# Patient Record
Sex: MALE | Race: WHITE
[De-identification: names, ages, dates, MRNs, and addresses within clinical notes are randomized per-mention and may not be internally consistent; named-entity substitution may affect disease eponyms.]

---

## 2019-07-16 ENCOUNTER — HOSPITAL ENCOUNTER (INPATIENT)
Dept: HOSPITAL 35 - TBACV | Age: 79
LOS: 2 days | Discharge: HOME | DRG: 246 | End: 2019-07-18
Attending: INTERNAL MEDICINE | Admitting: INTERNAL MEDICINE
Payer: COMMERCIAL

## 2019-07-16 VITALS — DIASTOLIC BLOOD PRESSURE: 96 MMHG | SYSTOLIC BLOOD PRESSURE: 146 MMHG

## 2019-07-16 VITALS — DIASTOLIC BLOOD PRESSURE: 53 MMHG | SYSTOLIC BLOOD PRESSURE: 118 MMHG

## 2019-07-16 VITALS — SYSTOLIC BLOOD PRESSURE: 132 MMHG | DIASTOLIC BLOOD PRESSURE: 50 MMHG

## 2019-07-16 VITALS — DIASTOLIC BLOOD PRESSURE: 66 MMHG | SYSTOLIC BLOOD PRESSURE: 117 MMHG

## 2019-07-16 VITALS — DIASTOLIC BLOOD PRESSURE: 53 MMHG | SYSTOLIC BLOOD PRESSURE: 113 MMHG

## 2019-07-16 VITALS — SYSTOLIC BLOOD PRESSURE: 116 MMHG | DIASTOLIC BLOOD PRESSURE: 59 MMHG

## 2019-07-16 VITALS — SYSTOLIC BLOOD PRESSURE: 129 MMHG | DIASTOLIC BLOOD PRESSURE: 54 MMHG

## 2019-07-16 VITALS — DIASTOLIC BLOOD PRESSURE: 53 MMHG | SYSTOLIC BLOOD PRESSURE: 122 MMHG

## 2019-07-16 VITALS — SYSTOLIC BLOOD PRESSURE: 114 MMHG | DIASTOLIC BLOOD PRESSURE: 93 MMHG

## 2019-07-16 VITALS — DIASTOLIC BLOOD PRESSURE: 67 MMHG | SYSTOLIC BLOOD PRESSURE: 151 MMHG

## 2019-07-16 VITALS — DIASTOLIC BLOOD PRESSURE: 63 MMHG | SYSTOLIC BLOOD PRESSURE: 126 MMHG

## 2019-07-16 VITALS — SYSTOLIC BLOOD PRESSURE: 127 MMHG | DIASTOLIC BLOOD PRESSURE: 67 MMHG

## 2019-07-16 VITALS — DIASTOLIC BLOOD PRESSURE: 58 MMHG | SYSTOLIC BLOOD PRESSURE: 142 MMHG

## 2019-07-16 VITALS — DIASTOLIC BLOOD PRESSURE: 64 MMHG | SYSTOLIC BLOOD PRESSURE: 115 MMHG

## 2019-07-16 VITALS — DIASTOLIC BLOOD PRESSURE: 51 MMHG | SYSTOLIC BLOOD PRESSURE: 108 MMHG

## 2019-07-16 VITALS — SYSTOLIC BLOOD PRESSURE: 133 MMHG | DIASTOLIC BLOOD PRESSURE: 66 MMHG

## 2019-07-16 VITALS — SYSTOLIC BLOOD PRESSURE: 108 MMHG | DIASTOLIC BLOOD PRESSURE: 55 MMHG

## 2019-07-16 VITALS — DIASTOLIC BLOOD PRESSURE: 59 MMHG | SYSTOLIC BLOOD PRESSURE: 118 MMHG

## 2019-07-16 VITALS — SYSTOLIC BLOOD PRESSURE: 130 MMHG | DIASTOLIC BLOOD PRESSURE: 59 MMHG

## 2019-07-16 VITALS — DIASTOLIC BLOOD PRESSURE: 64 MMHG | SYSTOLIC BLOOD PRESSURE: 116 MMHG

## 2019-07-16 VITALS — SYSTOLIC BLOOD PRESSURE: 130 MMHG | DIASTOLIC BLOOD PRESSURE: 55 MMHG

## 2019-07-16 VITALS — DIASTOLIC BLOOD PRESSURE: 62 MMHG | SYSTOLIC BLOOD PRESSURE: 123 MMHG

## 2019-07-16 VITALS — DIASTOLIC BLOOD PRESSURE: 54 MMHG | SYSTOLIC BLOOD PRESSURE: 111 MMHG

## 2019-07-16 VITALS — DIASTOLIC BLOOD PRESSURE: 52 MMHG | SYSTOLIC BLOOD PRESSURE: 123 MMHG

## 2019-07-16 VITALS — SYSTOLIC BLOOD PRESSURE: 133 MMHG | DIASTOLIC BLOOD PRESSURE: 106 MMHG

## 2019-07-16 VITALS — SYSTOLIC BLOOD PRESSURE: 115 MMHG | DIASTOLIC BLOOD PRESSURE: 58 MMHG

## 2019-07-16 VITALS — DIASTOLIC BLOOD PRESSURE: 64 MMHG | SYSTOLIC BLOOD PRESSURE: 89 MMHG

## 2019-07-16 VITALS — DIASTOLIC BLOOD PRESSURE: 70 MMHG | SYSTOLIC BLOOD PRESSURE: 148 MMHG

## 2019-07-16 VITALS — DIASTOLIC BLOOD PRESSURE: 57 MMHG | SYSTOLIC BLOOD PRESSURE: 114 MMHG

## 2019-07-16 VITALS — DIASTOLIC BLOOD PRESSURE: 97 MMHG | SYSTOLIC BLOOD PRESSURE: 126 MMHG

## 2019-07-16 VITALS — DIASTOLIC BLOOD PRESSURE: 62 MMHG | SYSTOLIC BLOOD PRESSURE: 118 MMHG

## 2019-07-16 VITALS — DIASTOLIC BLOOD PRESSURE: 56 MMHG | SYSTOLIC BLOOD PRESSURE: 117 MMHG

## 2019-07-16 VITALS — DIASTOLIC BLOOD PRESSURE: 61 MMHG | SYSTOLIC BLOOD PRESSURE: 140 MMHG

## 2019-07-16 VITALS — SYSTOLIC BLOOD PRESSURE: 117 MMHG | DIASTOLIC BLOOD PRESSURE: 64 MMHG

## 2019-07-16 VITALS — DIASTOLIC BLOOD PRESSURE: 54 MMHG | SYSTOLIC BLOOD PRESSURE: 129 MMHG

## 2019-07-16 VITALS — DIASTOLIC BLOOD PRESSURE: 62 MMHG | SYSTOLIC BLOOD PRESSURE: 130 MMHG

## 2019-07-16 VITALS — BODY MASS INDEX: 30.13 KG/M2 | WEIGHT: 192 LBS | HEIGHT: 67.01 IN

## 2019-07-16 VITALS — SYSTOLIC BLOOD PRESSURE: 114 MMHG | DIASTOLIC BLOOD PRESSURE: 55 MMHG

## 2019-07-16 VITALS — DIASTOLIC BLOOD PRESSURE: 60 MMHG | SYSTOLIC BLOOD PRESSURE: 117 MMHG

## 2019-07-16 VITALS — DIASTOLIC BLOOD PRESSURE: 57 MMHG | SYSTOLIC BLOOD PRESSURE: 110 MMHG

## 2019-07-16 VITALS — SYSTOLIC BLOOD PRESSURE: 119 MMHG | DIASTOLIC BLOOD PRESSURE: 61 MMHG

## 2019-07-16 VITALS — DIASTOLIC BLOOD PRESSURE: 68 MMHG | SYSTOLIC BLOOD PRESSURE: 97 MMHG

## 2019-07-16 VITALS — SYSTOLIC BLOOD PRESSURE: 133 MMHG | DIASTOLIC BLOOD PRESSURE: 63 MMHG

## 2019-07-16 VITALS — DIASTOLIC BLOOD PRESSURE: 50 MMHG | SYSTOLIC BLOOD PRESSURE: 121 MMHG

## 2019-07-16 VITALS — DIASTOLIC BLOOD PRESSURE: 54 MMHG | SYSTOLIC BLOOD PRESSURE: 120 MMHG

## 2019-07-16 VITALS — SYSTOLIC BLOOD PRESSURE: 117 MMHG | DIASTOLIC BLOOD PRESSURE: 56 MMHG

## 2019-07-16 VITALS — SYSTOLIC BLOOD PRESSURE: 128 MMHG | DIASTOLIC BLOOD PRESSURE: 54 MMHG

## 2019-07-16 VITALS — SYSTOLIC BLOOD PRESSURE: 99 MMHG | DIASTOLIC BLOOD PRESSURE: 53 MMHG

## 2019-07-16 VITALS — SYSTOLIC BLOOD PRESSURE: 126 MMHG | DIASTOLIC BLOOD PRESSURE: 67 MMHG

## 2019-07-16 VITALS — DIASTOLIC BLOOD PRESSURE: 58 MMHG | SYSTOLIC BLOOD PRESSURE: 106 MMHG

## 2019-07-16 VITALS — SYSTOLIC BLOOD PRESSURE: 103 MMHG | DIASTOLIC BLOOD PRESSURE: 67 MMHG

## 2019-07-16 VITALS — DIASTOLIC BLOOD PRESSURE: 53 MMHG | SYSTOLIC BLOOD PRESSURE: 106 MMHG

## 2019-07-16 VITALS — DIASTOLIC BLOOD PRESSURE: 61 MMHG | SYSTOLIC BLOOD PRESSURE: 133 MMHG

## 2019-07-16 VITALS — DIASTOLIC BLOOD PRESSURE: 64 MMHG | SYSTOLIC BLOOD PRESSURE: 123 MMHG

## 2019-07-16 DIAGNOSIS — I21.19: Primary | ICD-10-CM

## 2019-07-16 DIAGNOSIS — Z79.899: ICD-10-CM

## 2019-07-16 DIAGNOSIS — E78.00: ICD-10-CM

## 2019-07-16 DIAGNOSIS — Z91.19: ICD-10-CM

## 2019-07-16 DIAGNOSIS — E11.9: ICD-10-CM

## 2019-07-16 DIAGNOSIS — Z95.5: ICD-10-CM

## 2019-07-16 DIAGNOSIS — I50.33: ICD-10-CM

## 2019-07-16 DIAGNOSIS — Z79.82: ICD-10-CM

## 2019-07-16 DIAGNOSIS — E78.5: ICD-10-CM

## 2019-07-16 DIAGNOSIS — M19.90: ICD-10-CM

## 2019-07-16 DIAGNOSIS — E53.8: ICD-10-CM

## 2019-07-16 DIAGNOSIS — I25.10: ICD-10-CM

## 2019-07-16 DIAGNOSIS — Z90.49: ICD-10-CM

## 2019-07-16 LAB
ANION GAP SERPL CALC-SCNC: 9 MMOL/L (ref 7–16)
BUN SERPL-MCNC: 21 MG/DL (ref 7–18)
CALCIUM SERPL-MCNC: 8.3 MG/DL (ref 8.5–10.1)
CHLORIDE SERPL-SCNC: 103 MMOL/L (ref 98–107)
CHOLEST SERPL-MCNC: 106 MG/DL (ref ?–200)
CO2 SERPL-SCNC: 25 MMOL/L (ref 21–32)
CREAT SERPL-MCNC: 0.8 MG/DL (ref 0.7–1.3)
ERYTHROCYTE [DISTWIDTH] IN BLOOD BY AUTOMATED COUNT: 13.8 % (ref 10.5–14.5)
GLUCOSE SERPL-MCNC: 233 MG/DL (ref 74–106)
HCT VFR BLD CALC: 42.1 % (ref 42–52)
HDLC SERPL-MCNC: 40 MG/DL (ref 40–?)
HGB BLD-MCNC: 14.4 GM/DL (ref 14–18)
INR PPP: 1.5
LDLC SERPL-MCNC: 57 MG/DL (ref ?–100)
MCH RBC QN AUTO: 29.6 PG (ref 26–34)
MCHC RBC AUTO-ENTMCNC: 34.1 G/DL (ref 28–37)
MCV RBC: 86.7 FL (ref 80–100)
PLATELET # BLD: 149 THOU/UL (ref 150–400)
POTASSIUM SERPL-SCNC: 4.2 MMOL/L (ref 3.5–5.1)
PROTHROMBIN TIME: 16.1 SECONDS (ref 9.3–11.4)
RBC # BLD AUTO: 4.85 MIL/UL (ref 4.5–6)
SODIUM SERPL-SCNC: 137 MMOL/L (ref 136–145)
TC:HDL: 2.7 RATIO
TRIGL SERPL-MCNC: 49 MG/DL (ref ?–150)
TROPONIN I SERPL-MCNC: 0.46 NG/ML (ref ?–0.06)
VLDLC SERPL CALC-MCNC: 10 MG/DL (ref ?–40)
WBC # BLD AUTO: 7.2 THOU/UL (ref 4–11)

## 2019-07-16 NOTE — NUR
PT ARRIVED TO UNIT AT 0335 ACCOMPANIED BY CATH LAB RN. PT ALERT AND ORIENTED
X4. SB ON THE MONITOR. PT DENIES ANY PAIN OR DISCOMFORT AT THIS TIME. PT VSS.
R GROIN ARTERIAL SHEATH ON ARRIVAL. ARTERIAL SHEATH REMOVED AT 0542. PRESSURE
HELD FOR 25 MIN. HEMOSTASIS ACHIEVED AT 0607. PT DENIES ANY COMPLAINS AT THIS
TIME. PT EVALUTED BY NGUYEN BRANHAM. PT MAKING PROGRESS TOWARDS GOALS.

## 2019-07-16 NOTE — NUR
INITIAL ASSESSMENT:
Received consult. SW reviewed chart and spoke with nursing. Pt was transferred
to [facility] from [facility] due to STEMI. Pt had stent placed
this morning and will have a second stent placed tomorrow. SW met with pt at
bedside. Introduced role of SW. Pt is alert/orientated x 4. Pt reports he
lives at home with his wife in [place]. Pt and spouse [place] on [place].
Prior to admission, pt was independent with ADLs. No use of DME. No hx of HH
services or SNF/Rehab placement. 0 steps to enter the home--have ramp in
place.  0 steps inside the home.  Pt's PCP is Dr. Nik Casiano. Plan is for pt
to return home when medically stable. SW is following to assist as needed with
discharge planning.

## 2019-07-16 NOTE — NUR
ASSUMED CARE AT 0700. PATIENT ON BEDREST S/P RIGHT GROIN SHEATH PULL.
HEMOSTASIS WAS 0607 PER RN REPORT. RIGHT GROIN SITE C/D/I WITHOUT BRUIE.
PATIENT DENIES PAIN, AND SOB. PATIENT UP WITH SBA AT 1107. PATIENT STEADY ON
FEET. RIGHT GROIN SITE REMAINS C/D/I AFTER WALK AROUND THE ICU. NO FURTHER
CONCERNS AT THIS TIME. WILL CONTINUE TO MONITOR AND CARE PER PLAN OF CARE.

## 2019-07-17 VITALS — SYSTOLIC BLOOD PRESSURE: 116 MMHG | DIASTOLIC BLOOD PRESSURE: 61 MMHG

## 2019-07-17 VITALS — DIASTOLIC BLOOD PRESSURE: 66 MMHG | SYSTOLIC BLOOD PRESSURE: 123 MMHG

## 2019-07-17 VITALS — DIASTOLIC BLOOD PRESSURE: 65 MMHG | SYSTOLIC BLOOD PRESSURE: 108 MMHG

## 2019-07-17 VITALS — SYSTOLIC BLOOD PRESSURE: 127 MMHG | DIASTOLIC BLOOD PRESSURE: 70 MMHG

## 2019-07-17 VITALS — SYSTOLIC BLOOD PRESSURE: 111 MMHG | DIASTOLIC BLOOD PRESSURE: 66 MMHG

## 2019-07-17 VITALS — DIASTOLIC BLOOD PRESSURE: 55 MMHG | SYSTOLIC BLOOD PRESSURE: 102 MMHG

## 2019-07-17 VITALS — DIASTOLIC BLOOD PRESSURE: 60 MMHG | SYSTOLIC BLOOD PRESSURE: 96 MMHG

## 2019-07-17 VITALS — SYSTOLIC BLOOD PRESSURE: 116 MMHG | DIASTOLIC BLOOD PRESSURE: 66 MMHG

## 2019-07-17 VITALS — SYSTOLIC BLOOD PRESSURE: 118 MMHG | DIASTOLIC BLOOD PRESSURE: 77 MMHG

## 2019-07-17 VITALS — SYSTOLIC BLOOD PRESSURE: 111 MMHG | DIASTOLIC BLOOD PRESSURE: 57 MMHG

## 2019-07-17 VITALS — SYSTOLIC BLOOD PRESSURE: 109 MMHG | DIASTOLIC BLOOD PRESSURE: 57 MMHG

## 2019-07-17 LAB
ANION GAP SERPL CALC-SCNC: 7 MMOL/L (ref 7–16)
BUN SERPL-MCNC: 19 MG/DL (ref 7–18)
CALCIUM SERPL-MCNC: 8.3 MG/DL (ref 8.5–10.1)
CHLORIDE SERPL-SCNC: 106 MMOL/L (ref 98–107)
CO2 SERPL-SCNC: 27 MMOL/L (ref 21–32)
CREAT SERPL-MCNC: 1 MG/DL (ref 0.7–1.3)
EST. AVERAGE GLUCOSE BLD GHB EST-MCNC: 189 MG/DL
GLUCOSE SERPL-MCNC: 143 MG/DL (ref 74–106)
GLYCOHEMOGLOBIN (HGB A1C): 8.2 % (ref 4.8–5.6)
POTASSIUM SERPL-SCNC: 4.1 MMOL/L (ref 3.5–5.1)
SODIUM SERPL-SCNC: 140 MMOL/L (ref 136–145)

## 2019-07-17 NOTE — NUR
ASSUMED CARE AT SHIFT CHANGE, ALERT AND ORIENTED X4. POST CATH VSS, LT GRION
SITE D/C/I. PATIENT DENIES ANY CP OR DISCOMFORT, FAMILY AT BEDSIDE. AND WILL
CONTINUE WITH POC.

## 2019-07-17 NOTE — NUR
ASSUMED PT CARE AT 1900. PT A/OX4, VITAL SIGNS STABLE, ASSESSMENT AS CHARTED.
GROIN SITE CLEAN, DRY, INTACT. NO COMPLAINTS OF PAIN/CHEST PAIN. PT RESTED
WELL THROUGH THE NIGHT. WILL CONTINUE TO MONITOR.

## 2019-07-18 VITALS — DIASTOLIC BLOOD PRESSURE: 71 MMHG | SYSTOLIC BLOOD PRESSURE: 112 MMHG

## 2019-07-18 VITALS — SYSTOLIC BLOOD PRESSURE: 127 MMHG | DIASTOLIC BLOOD PRESSURE: 56 MMHG

## 2019-07-18 VITALS — SYSTOLIC BLOOD PRESSURE: 112 MMHG | DIASTOLIC BLOOD PRESSURE: 71 MMHG

## 2019-07-18 LAB
ALBUMIN SERPL-MCNC: 3.3 G/DL (ref 3.4–5)
ALT SERPL-CCNC: 21 U/L (ref 30–65)
ANION GAP SERPL CALC-SCNC: 7 MMOL/L (ref 7–16)
AST SERPL-CCNC: 18 U/L (ref 15–37)
BILIRUB SERPL-MCNC: 0.7 MG/DL
BUN SERPL-MCNC: 18 MG/DL (ref 7–18)
CALCIUM SERPL-MCNC: 8.2 MG/DL (ref 8.5–10.1)
CHLORIDE SERPL-SCNC: 106 MMOL/L (ref 98–107)
CO2 SERPL-SCNC: 26 MMOL/L (ref 21–32)
CREAT SERPL-MCNC: 0.8 MG/DL (ref 0.7–1.3)
ERYTHROCYTE [DISTWIDTH] IN BLOOD BY AUTOMATED COUNT: 13.9 % (ref 10.5–14.5)
GLUCOSE SERPL-MCNC: 138 MG/DL (ref 74–106)
HCT VFR BLD CALC: 42 % (ref 42–52)
HGB BLD-MCNC: 14.3 GM/DL (ref 14–18)
MCH RBC QN AUTO: 29.9 PG (ref 26–34)
MCHC RBC AUTO-ENTMCNC: 34 G/DL (ref 28–37)
MCV RBC: 87.7 FL (ref 80–100)
PLATELET # BLD: 142 THOU/UL (ref 150–400)
POTASSIUM SERPL-SCNC: 4.1 MMOL/L (ref 3.5–5.1)
PROT SERPL-MCNC: 6.1 G/DL (ref 6.4–8.2)
RBC # BLD AUTO: 4.78 MIL/UL (ref 4.5–6)
SODIUM SERPL-SCNC: 139 MMOL/L (ref 136–145)
TROPONIN I SERPL-MCNC: 2.85 NG/ML (ref ?–0.06)
WBC # BLD AUTO: 7.5 THOU/UL (ref 4–11)

## 2019-07-18 NOTE — NUR
ASSUMED PT CARE AT 1900. PT A/OX4, VITAL SIGNS STABLE, ASSESSMENT AS CHARTED.
NO COMPLAINTS OF PAIN. GROIN SITE CLEAN, DRY, INTACT. PT RESTED WELL THROUGH
THE NIGHT. PROGRESSING TOWARD PLAN OF CARE. WILL CONTINUE TO MONITOR.

## 2019-07-18 NOTE — NUR
ASSESSMENT AS CHARTED. PT ALERT AND ORIENTED. VSS. DENIED HAVING PAIN OR
DISCOMFORT. RIGHT AND LEFT GROIN INCISION C/D/I. NO HEMATOMA NOTED. ORDERS
GIVEN TO DISCHARGE PT TO HOME. DISCHARGE INSTRUCTIONS GIVEN TO PT. PT
VERBERLIZE UNDERSTANDING. PT LEFT THE FACILITY ACCOMPANIED BY THE WIFE.